# Patient Record
Sex: FEMALE | Race: WHITE
[De-identification: names, ages, dates, MRNs, and addresses within clinical notes are randomized per-mention and may not be internally consistent; named-entity substitution may affect disease eponyms.]

---

## 2022-05-16 ENCOUNTER — HOSPITAL ENCOUNTER (EMERGENCY)
Dept: HOSPITAL 63 - ER | Age: 72
Discharge: HOME | End: 2022-05-16
Payer: MEDICARE

## 2022-05-16 VITALS — BODY MASS INDEX: 23.94 KG/M2 | WEIGHT: 130.07 LBS | HEIGHT: 62 IN

## 2022-05-16 VITALS — DIASTOLIC BLOOD PRESSURE: 80 MMHG | SYSTOLIC BLOOD PRESSURE: 172 MMHG
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
## 2022-05-16 DIAGNOSIS — S62.305A: Primary | ICD-10-CM
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
## 2022-05-16 PROCEDURE — 70486 CT MAXILLOFACIAL W/O DYE: CPT

## 2022-05-16 PROCEDURE — 73110 X-RAY EXAM OF WRIST: CPT

## 2022-05-16 PROCEDURE — 29125 APPL SHORT ARM SPLINT STATIC: CPT

## 2022-05-16 PROCEDURE — 73130 X-RAY EXAM OF HAND: CPT

## 2022-05-16 NOTE — RAD
Exam: XR HAND_LEFT 3 VIEWS, XR LT WRIST 3VIEWS



History: Fall. Pain.



Comparison: None.



Findings:

There is an oblique fracture of the distal left fourth metacarpal with approximately 4 mm of ulnar an
d dorsal displacement. Degenerative changes at the wrist greatest in the first CMC, triscaphe and dis
kathy radioulnar joint. 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



Impression: 

1.  Mildly displaced fracture of the distal fourth metacarpal.



Electronically signed by: Magdiel Thomson MD (5/16/2022 1:15 PM) [id number]

## 2022-05-16 NOTE — RAD
EXAM: Maxillofacial bone CT without contrast.



HISTORY: Trauma. Pain.



TECHNIQUE: Computed tomographic images of the maxillofacial bones were obtained without contrast.



*One or more of the following individualized dose reduction techniques were utilized for this examina
tion:  

1. Automated exposure control.  

2. Adjustment of the mA and/or kV according to patient size.  

3. Use of iterative reconstruction technique.



COMPARISON: There is no acute fracture. There is leftward nasal septal deviation. The ostiomeatal messi
kathy units are widely patent. There is no sinus opacification or air-fluid level. There is degenerativ
e spurring and bony remodeling involving the temporomandibular joints, consistent with osteoarthritis
. There is soft tissue stranding along the posterior inferior left mandible due to a contusion. No fo
reign body is seen. There are degenerative changes involving the cervical spine, including endplate r
emodeling and facet arthropathy at multiple levels. This results in mild left foraminal stenosis at C
4-C5 and mild bilateral foraminal stenosis at C5-C6. There is cerebral volume loss. There is no mass 
effect or midline shift. There is no hydrocephalus.



IMPRESSION:

1. No evidence of acute maxillofacial bone fracture. There is a soft tissue contusion along the poste
rior inferior left mandible.

2. Mild nasal septal deviation.

3. Degenerative change involving the visualized cervical spine, described above.

4. Mild cerebral volume loss.



Electronically signed by: Yen Espinal MD (5/16/2022 1:08 PM) [facility]

## 2022-05-16 NOTE — PHYS DOC
Past History


Past Surgical History:  Other


Additional Past Surgical Histo:  Thyroid





General Adult


EDM:


Chief Complaint:  MECHANICAL FALL





HPI:


HPI:


71-year-old female presents after mechanical fall.  Patient was walking into the

room.  This toe on the concrete onto the concrete sidewalk.  She has pain in the

left hand over the fourth and fifth metacarpal bones.  She is concerned about 

fracture.  Patient also believes that she hit the left side of her chin on the 

ground and now has pain on the right side.  She is able to open and close her 

mouth but it is uncomfortable.  She denies loss of consciousness.  She said no 

vomiting.  She has no other complaints at this time.





Review of Systems:


Review of Systems:


Constitutional:  Denies fever or chills 


Eyes:  Denies change in visual acuity 


HENT:  Denies nasal congestion or sore throat.  Right jaw pain


Respiratory:  Denies cough or shortness of breath 


Cardiovascular:  Denies chest pain or edema 


GI:  Denies abdominal pain, nausea, vomiting, bloody stools or diarrhea 


: Denies dysuria 


Musculoskeletal: Left hand pain


Integument:  Denies rash 


Neurologic:  Denies headache, focal weakness or sensory changes 


Endocrine:  Denies polyuria or polydipsia 


Lymphatic:  Denies swollen glands 


Psychiatric:  Denies depression or anxiety





Allergies:


Allergies:





Allergies








Coded Allergies Type Severity Reaction Last Updated Verified


 


  No Known Drug Allergies    5/16/22 No











Physical Exam:


PE:





Constitutional: Well developed, well nourished, no acute distress, non-toxic 

appearance. []


HENT: Normocephalic, atraumatic, bilateral external ears normal, oropharynx 

moist, no oral exudates, nose normal. []


Eyes: PERRLA, EOMI, conjunctiva normal, no discharge. [] 


Neck: Normal range of motion, no tenderness, supple, no stridor. [] 


Cardiovascular: Heart rate regular rhythm, no murmur []


Lungs & Thorax:  Bilateral breath sounds clear to auscultation []


Abdomen: Bowel sounds normal, soft, no tenderness, no masses, no pulsatile 

masses. [] 


Skin: Multiple small abrasions of the left arm and hand [] 


Back: No tenderness, no CVA tenderness. [] 


Extremities: Left hand with tenderness and ecchymosis over the fourth and fifth 

metacarpal.  [] 


Neurologic: Alert and oriented X 3, normal motor function, normal sensory 

function, no focal deficits noted. []


Psychologic: Affect normal, judgement normal, mood normal. []





Current Patient Data:


Vital Signs:





                                   Vital Signs








  Date Time  Temp Pulse Resp B/P (MAP) Pulse Ox O2 Delivery O2 Flow Rate FiO2


 


5/16/22 12:30 97.9 70 16 172/80 (110) 98 Room Air  











EKG:


EKG:


[]





Radiology/Procedures:


Radiology/Procedures:


[]


Impressions:


Exam: XR HAND_LEFT 3 VIEWS, XR LT WRIST 3VIEWS





History: Fall. Pain.





Comparison: None.





Findings:


There is an oblique fracture of the distal left fourth metacarpal with a

pproximately 4 mm of ulnar and dorsal displacement. Degenerative changes at the 

wrist greatest in the first CMC, triscaphe and distal radioulnar joint. Chronic 

ununited ulnar styloid fracture with well-corticated margins.





Impression: 


1.  Mildly displaced fracture of the distal fourth metacarpal.





Electronically signed by: Magdiel Pina MD (5/16/2022 1:15 PM) [id number]





DICTATED AND SIGNED BY:     MAGDIEL PINA MD


DATE:     05/16/22 1312





CC: CHADD GOODMAN DO ~














EXAM: Maxillofacial bone CT without contrast.





HISTORY: Trauma. Pain.





TECHNIQUE: Computed tomographic images of the maxillofacial bones were obtained 

without contrast.





*One or more of the following individualized dose reduction techniques were 

utilized for this examination:  


1. Automated exposure control.  


2. Adjustment of the mA and/or kV according to patient size.  


3. Use of iterative reconstruction technique.





COMPARISON: There is no acute fracture. There is leftward nasal septal 

deviation. The ostiomeatal meatal units are widely patent. There is no sinus 

opacification or air-fluid level. There is degenerative spurring and bony 

remodeling involving the temporomandibular joints, consistent with 

osteoarthritis. There is soft tissue stranding along the posterior inferior left

 mandible due to a contusion. No foreign body is seen. There are degenerative 

changes involving the cervical spine, including endplate remodeling and facet 

arthropathy at multiple levels. This results in mild left foraminal stenosis at 

C4-C5 and mild bilateral foraminal stenosis at C5-C6. There is cerebral volume 

loss. There is no mass effect or midline shift. There is no hydrocephalus.





IMPRESSION:


1. No evidence of acute maxillofacial bone fracture. There is a soft tissue 

contusion along the posterior inferior left mandible.


2. Mild nasal septal deviation.


3. Degenerative change involving the visualized cervical spine, described above.


4. Mild cerebral volume loss.





Electronically signed by: Yen Field MD (5/16/2022 1:08 PM) [facility]





DICTATED AND SIGNED BY:     YEN FIELD MD


DATE:     05/16/22 6928





CC: CHADD GOODMAN DO ~





Heart Score:


C/O Chest Pain:  N/A


Risk Factors:


Risk Factors:  DM, Current or recent (<one month) smoker, HTN, HLP, family 

history of CAD, obesity.


Risk Scores:


Score 0 - 3:  2.5% MACE over next 6 weeks - Discharge Home


Score 4 - 6:  20.3% MACE over next 6 weeks - Admit for Clinical Observation


Score 7 - 10:  72.7% MACE over next 6 weeks - Early Invasive Strategies





Course & Med Decision Making:


Course & Med Decision Making


Pertinent Labs and Imaging studies reviewed. (See chart for details)


The patient's maxillofacial CT is negative for acute findings.  She does have a 

very mildly displaced fracture of the distal fourth metacarpal of the left hand.

  We will place her in a splint advised that she follow-up with orthopedics.  

She is stable for discharge at this time.


[]





Dragon Disclaimer:


Dragon Disclaimer:


This electronic medical record was generated, in whole or in part, using a voice

 recognition dictation system.





Departure


Departure:


Impression:  


   Primary Impression:  


   Fracture of fourth metacarpal bone of left hand


Disposition:  01 HOME / SELF CARE / HOMELESS


Condition:  STABLE


Patient Instructions:  Hand Fracture, Metacarpals, Easy-to-Read











CHADD GOODMAN DO                 May 16, 2022 12:53

## 2022-05-16 NOTE — RAD
Exam: XR HAND_LEFT 3 VIEWS, XR LT WRIST 3VIEWS



History: Fall. Pain.



Comparison: None.



Findings:

There is an oblique fracture of the distal left fourth metacarpal with approximately 4 mm of ulnar an
d dorsal displacement. Degenerative changes at the wrist greatest in the first CMC, triscaphe and dis
kathy radioulnar joint. 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



Impression: 

1.  Mildly displaced fracture of the distal fourth metacarpal.



Electronically signed by: Magdiel Thomson MD (5/16/2022 1:15 PM) [id number]